# Patient Record
Sex: MALE | Race: BLACK OR AFRICAN AMERICAN | NOT HISPANIC OR LATINO | URBAN - METROPOLITAN AREA
[De-identification: names, ages, dates, MRNs, and addresses within clinical notes are randomized per-mention and may not be internally consistent; named-entity substitution may affect disease eponyms.]

---

## 2017-02-13 ENCOUNTER — EMERGENCY (EMERGENCY)
Facility: HOSPITAL | Age: 22
LOS: 1 days | Discharge: HOME | End: 2017-02-13
Attending: EMERGENCY MEDICINE | Admitting: EMERGENCY MEDICINE
Payer: COMMERCIAL

## 2017-02-13 VITALS
SYSTOLIC BLOOD PRESSURE: 141 MMHG | TEMPERATURE: 98 F | OXYGEN SATURATION: 100 % | DIASTOLIC BLOOD PRESSURE: 80 MMHG | WEIGHT: 154.98 LBS | RESPIRATION RATE: 16 BRPM | HEART RATE: 66 BPM

## 2017-02-13 DIAGNOSIS — R51 HEADACHE: ICD-10-CM
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
## 2017-02-13 DIAGNOSIS — J45.909 UNSPECIFIED ASTHMA, UNCOMPLICATED: ICD-10-CM

## 2017-02-13 DIAGNOSIS — S31.31XA LACERATION WITHOUT FOREIGN BODY OF SCROTUM AND TESTES, INITIAL ENCOUNTER: ICD-10-CM
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
## 2017-02-13 PROCEDURE — 99284 EMERGENCY DEPT VISIT MOD MDM: CPT | Mod: 25

## 2017-02-13 PROCEDURE — 71046 X-RAY EXAM CHEST 2 VIEWS: CPT

## 2017-02-13 PROCEDURE — 99283 EMERGENCY DEPT VISIT LOW MDM: CPT | Mod: 25

## 2017-02-13 PROCEDURE — 93010 ELECTROCARDIOGRAM REPORT: CPT

## 2017-02-13 PROCEDURE — 99053 MED SERV 10PM-8AM 24 HR FAC: CPT

## 2017-02-13 PROCEDURE — 71020: CPT | Mod: 26

## 2017-02-13 PROCEDURE — 93005 ELECTROCARDIOGRAM TRACING: CPT

## 2017-02-13 RX ORDER — ACETAMINOPHEN 500 MG
650 TABLET ORAL ONCE
Qty: 0 | Refills: 0 | Status: COMPLETED | OUTPATIENT
Start: 2017-02-13 | End: 2017-02-13

## 2017-02-13 RX ADMIN — Medication 650 MILLIGRAM(S): at 23:46

## 2017-02-13 NOTE — ED ADULT NURSE NOTE - CHPI ED SYMPTOMS NEG
no tingling/no weakness/no decreased eating/drinking/no vomiting/no nausea/no dizziness/no numbness/no chills/no fever

## 2017-02-13 NOTE — ED PROVIDER NOTE - NEUROLOGICAL, MLM
Alert and oriented, cranial nerves grossly intact, equal strength and sensation throughout, ambulates w/normal gait

## 2017-02-13 NOTE — ED ADULT TRIAGE NOTE - CHIEF COMPLAINT QUOTE
pt complaining of 9/10 head ache x2 days denies N/V admits to sensitivity to light. Tried Motrin at home without relief.

## 2017-02-13 NOTE — ED PROVIDER NOTE - MEDICAL DECISION MAKING DETAILS
Pt with scrotal laceration d/t shaving, dermabond applied w/adequate hemostasis. Also c/o chest tightness earlier today, none currently. No EKG changes, VSS, CXR clear, lungs clear on exam. No wheezing. Will offer rx for albuterol d/t his h/o asthma. Finally c/o HA but no red flag s/s indicating need for emergent imaging. Recommend supportive care, close f/u w/neuro on discharge if HAs continue

## 2017-02-13 NOTE — ED PROVIDER NOTE - OBJECTIVE STATEMENT
21yom with h/o childhood asthma although no exacerbations in years p/w several complaints. His main issue is that he cut his scrotum shaving w/an electric razor earlier today and it continues to bleed intermittently despite using a bandaid. He also reports that he has had a frontal HA w/photophobia since yesterday that did not respond to 2 Motrin at home. Finally he also reports he experienced some transient chest tightness while at work today and was concerned that his asthma has returned as a neighbor in his building smokes and he can smell it from under his bedroom door at night.

## 2017-02-13 NOTE — ED ADULT NURSE NOTE - OBJECTIVE STATEMENT
21y M, A&ox3, came into Er c/o headache and shaving accident. Pt states had headache all day no relief after advil at 1pm. 7/10 pain. Pt c/o shaving accident to scrotum. Stated wanted to get it checked out. No active bleeding at the time. NAD. Will continue to monitor.

## 2017-02-13 NOTE — ED PROVIDER NOTE - GENITOURINARY, MLM
Chaperoned by PCA Rick. Normal circumcised penis. Right side of scrotum w/small punctate laceration overlying a vein which continues to ooze blood

## 2017-02-14 VITALS
DIASTOLIC BLOOD PRESSURE: 86 MMHG | RESPIRATION RATE: 18 BRPM | SYSTOLIC BLOOD PRESSURE: 134 MMHG | OXYGEN SATURATION: 100 % | HEART RATE: 72 BPM

## 2017-02-14 RX ORDER — ALBUTEROL 90 UG/1
2 AEROSOL, METERED ORAL
Qty: 1 | Refills: 0 | OUTPATIENT
Start: 2017-02-14 | End: 2017-03-16

## 2024-01-11 NOTE — ED ADULT TRIAGE NOTE - NS ED TRIAGE AVPU SCALE
Pt alert and oriented times 4. Makes needs known to staff. On RA. No reports of CP, SOB, N/V, fever, chills or headaches. Pt is independent in room and continent of B/B. His Last CIWA at 12: AM  is 4,  and 4 AM is 1. No PRN medication for these CIWA scores. Patient showered and mistakenly removed nicotine patch around 11:30 PM. Charge Rn notified. Piv CDI, flushed and cap changed. During safety rounds, patient appears to be sleeping.   Alert-The patient is alert, awake and responds to voice. The patient is oriented to time, place, and person. The triage nurse is able to obtain subjective information.

## 2025-02-16 NOTE — ED PROVIDER NOTE - CPE EDP MUSC NORM
normal...